# Patient Record
Sex: FEMALE
[De-identification: names, ages, dates, MRNs, and addresses within clinical notes are randomized per-mention and may not be internally consistent; named-entity substitution may affect disease eponyms.]

---

## 2018-05-18 ENCOUNTER — HOSPITAL ENCOUNTER (EMERGENCY)
Dept: HOSPITAL 43 - DL.ED | Age: 43
LOS: 1 days | Discharge: NURSING, REHAB OR LONG-TERM CARE | End: 2018-05-19
Payer: MEDICAID

## 2018-05-18 DIAGNOSIS — I20.0: Primary | ICD-10-CM

## 2018-05-18 DIAGNOSIS — Z88.6: ICD-10-CM

## 2018-05-18 DIAGNOSIS — I11.0: ICD-10-CM

## 2018-05-18 DIAGNOSIS — I50.9: ICD-10-CM

## 2018-05-18 PROCEDURE — 93005 ELECTROCARDIOGRAM TRACING: CPT

## 2018-05-18 PROCEDURE — 36415 COLL VENOUS BLD VENIPUNCTURE: CPT

## 2018-05-18 PROCEDURE — 80053 COMPREHEN METABOLIC PANEL: CPT

## 2018-05-18 PROCEDURE — 83880 ASSAY OF NATRIURETIC PEPTIDE: CPT

## 2018-05-18 PROCEDURE — 84484 ASSAY OF TROPONIN QUANT: CPT

## 2018-05-18 PROCEDURE — 96376 TX/PRO/DX INJ SAME DRUG ADON: CPT

## 2018-05-18 PROCEDURE — 85025 COMPLETE CBC W/AUTO DIFF WBC: CPT

## 2018-05-18 PROCEDURE — 96375 TX/PRO/DX INJ NEW DRUG ADDON: CPT

## 2018-05-18 PROCEDURE — 96365 THER/PROPH/DIAG IV INF INIT: CPT

## 2018-05-18 PROCEDURE — 99285 EMERGENCY DEPT VISIT HI MDM: CPT

## 2018-05-18 PROCEDURE — 71045 X-RAY EXAM CHEST 1 VIEW: CPT

## 2018-05-18 PROCEDURE — 96366 THER/PROPH/DIAG IV INF ADDON: CPT

## 2018-05-18 NOTE — EDM.PDOC
ED HPI GENERAL MEDICAL PROBLEM





- General


Chief Complaint: Cardiovascular Problem


Stated Complaint: BY AMBULANCE


Time Seen by Provider: 05/18/18 23:26


Source of Information: Reports: Patient, EMS


History Limitations: Reports: No Limitations





- History of Present Illness


INITIAL COMMENTS - FREE TEXT/NARRATIVE: 





EMS state they were transporting pt from [facility] to  for CP & PVCs, then 

suddenly got hypertensive with worsening CP gave total 3 NTG and CP now gone 

and BP better. then were told to start NTG drip but they don't have pump so 

came here. presently pt pain free but still hypertensive. belcort trop neg. ekg 

occassional couplets. pt accepted by Dr Larson. 





- Related Data


 Allergies











Allergy/AdvReac Type Severity Reaction Status Date / Time


 


aspirin Allergy  Other Verified 05/18/18 23:54


 


applesauce Allergy  Cannot Uncoded 05/18/18 23:54





   Remember  














ED ROS GENERAL





- Review of Systems


Review Of Systems: ROS reveals no pertinent complaints other than HPI.





ED EXAM, GENERAL





- Physical Exam


Exam: See Below


Exam Limited By: No Limitations


General Appearance: Alert, WD/WN, No Apparent Distress


Ears: Hearing Grossly Normal


Throat/Mouth: Normal Voice, No Airway Compromise


Head: Atraumatic


Neck: Non-Tender, Full Range of Motion


Respiratory/Chest: No Respiratory Distress


Cardiovascular: Regular Rate, Rhythm


GI/Abdominal: Soft, Non-Tender


Neurological: Alert, Oriented, Normal Cognition, Normal Gait, No Motor/Sensory 

Deficits


Psychiatric: Normal Affect, Normal Mood


Skin Exam: Warm, Dry, Normal Color


Lymphatic: No Adenopathy





Course





- Vital Signs


Last Recorded V/S: 


 Last Vital Signs











Temp  36.2 C   05/18/18 23:26


 


Pulse  80   05/18/18 23:26


 


Resp  20   05/18/18 23:26


 


BP  153/100 H  05/18/18 23:26


 


Pulse Ox  100   05/18/18 23:26














- Orders/Labs/Meds


Orders: 


 Active Orders 24 hr











 Category Date Time Status


 


 EKG Documentation Completion [RC] STAT Care  05/18/18 23:42 Active


 


 Nitroglycerin/D5W [Nitroglycerin  25 MG/D5W 250 ML] Med  05/18/18 23:45 Active





 25 mg in 250 ml IV TITRATE   








 Medication Orders





Nitroglycerin/Dextrose (Nitroglycerin  25 Mg/D5w 250 Ml)  25 mg in 250 mls @ 6 

mls/hr IV TITRATE ABDULLAHI; Protocol


   Last Admin: 05/19/18 00:02  Dose: 10 mcg/min, 6 mls/hr








Labs: 


 Laboratory Tests











  05/19/18 05/19/18 05/19/18 Range/Units





  00:00 00:00 00:00 


 


WBC  13.3 H    (5.0-10.0)  10^3/uL


 


RBC  3.85 L    (4.2-5.4)  10^6/uL


 


Hgb  11.6 L    (12.0-16.0)  g/dL


 


Hct  35.9 L    (37.0-47.0)  %


 


MCV  93.2    ()  fL


 


MCH  30.1    (27.0-34.0)  pg


 


MCHC  32.3 L    (33.0-35.0)  g/dL


 


Plt Count  305    (150-450)  10^3/uL


 


Neut % (Auto)  45.8    (42.2-75.2)  %


 


Lymph % (Auto)  44.4    (20.5-50.1)  %


 


Mono % (Auto)  8.3 H    (2-8)  %


 


Eos % (Auto)  1.2    (1.0-3.0)  %


 


Baso % (Auto)  0.3    (0.0-1.0)  %


 


Sodium   139   (135-145)  mmol/L


 


Potassium   3.6   (3.6-5.0)  mmol/L


 


Chloride   106   (101-111)  mmol/L


 


Carbon Dioxide   30.0   (21.0-31.0)  mmol/L


 


Anion Gap   6.6   


 


BUN   17   (7-18)  mg/dL


 


Creatinine   0.7   (0.6-1.3)  mg/dL


 


Est Cr Clr Drug Dosing   73.67   mL/min


 


Estimated GFR (MDRD)   > 60   


 


BUN/Creatinine Ratio   24.28   


 


Glucose   145 H   ()  mg/dL


 


Calcium   8.4   (8.4-10.2)  mg/dl


 


Total Bilirubin   0.2   (0.2-1.0)  mg/dL


 


AST   17   (10-42)  IU/L


 


ALT   15   (10-60)  IU/L


 


Alkaline Phosphatase   59   ()  IU/L


 


Troponin I   < 0.02   (0.00-0.02)  ng/ml


 


B-Natriuretic Peptide    156 H  (0-100)  pg/ml


 


Total Protein   6.5 L   (6.7-8.2)  g/dl


 


Albumin   3.4   (3.2-5.5)  g/dl


 


Globulin   3.1   


 


Albumin/Globulin Ratio   1.10   











Meds: 


Medications











Generic Name Dose Route Start Last Admin





  Trade Name Freq  PRN Reason Stop Dose Admin


 


Nitroglycerin/Dextrose  25 mg in 250 mls @ 6 mls/hr  05/18/18 23:45  05/19/18 00

:02





  Nitroglycerin  25 Mg/D5w 250 Ml  IV   10 mcg/min





  TITRATE ABDULLAHI   6 mls/hr





     Administration





     





  Protocol   





  10 MCG/MIN   














Discontinued Medications














Generic Name Dose Route Start Last Admin





  Trade Name Freq  PRN Reason Stop Dose Admin


 


Morphine Sulfate  2 mg  05/19/18 00:17  05/19/18 00:27





  Morphine  IVPUSH  05/19/18 00:18  2 mg





  ONETIME ONE   Administration





     





     





     





     


 


Ondansetron HCl  4 mg  05/19/18 00:17  05/19/18 00:28





  Zofran  IV  05/19/18 00:18  Not Given





  ONETIME ONE   





     





     





     





     


 


Ondansetron HCl  4 mg  05/19/18 00:20  05/19/18 00:28





  Zofran Odt  PO  05/19/18 00:21  4 mg





  ONETIME ONE   Administration





     





     





     





     














- Re-Assessments/Exams


Free Text/Narrative Re-Assessment/Exam: 





05/19/18 01:01


case discussed with GF and pt was kindly received under Dr Kingsley's care.





Departure





- Departure


Time of Disposition: 01:02


Disposition: DC/Tfer to [facility]


Reason for Transfer *Q: Other


Condition: Fair


Clinical Impression: 


Chest pain


Qualifiers:


 Chest pain type: chest pain due to myocardial ischemia Ischemic chest pain type

: unstable angina pectoris Qualified Code(s): I20.0 - Unstable angina





Hypertensive heart disease


Qualifiers:


 Heart failure presence: with heart failure Heart failure type: unspecified 

Qualified Code(s): I11.0 - Hypertensive heart disease with heart failure





Forms:  Interfacility Transfer EMTALA





- My Orders


Last 24 Hours: 


My Active Orders





05/18/18 23:42


EKG Documentation Completion [RC] STAT 





05/18/18 23:45


Nitroglycerin/D5W [Nitroglycerin  25 MG/D5W 250 ML] 25 mg in 250 ml IV TITRATE 














- Assessment/Plan


Last 24 Hours: 


My Active Orders





05/18/18 23:42


EKG Documentation Completion [RC] STAT 





05/18/18 23:45


Nitroglycerin/D5W [Nitroglycerin  25 MG/D5W 250 ML] 25 mg in 250 ml IV TITRATE

## 2018-05-19 LAB
CHLORIDE SERPL-SCNC: 106 MMOL/L (ref 101–111)
SODIUM SERPL-SCNC: 139 MMOL/L (ref 135–145)

## 2018-05-22 NOTE — EKG
05/18/2018- GARTH HOLMAN R -

 

FINDINGS:  EKG per my reading, shows ventricular bigeminy.

 

DD:  05/21/2018 23:11:56

DT:  05/21/2018 23:53:19

MODL

Job #:  [id number]